# Patient Record
Sex: MALE | Race: WHITE | NOT HISPANIC OR LATINO | Employment: OTHER | URBAN - METROPOLITAN AREA
[De-identification: names, ages, dates, MRNs, and addresses within clinical notes are randomized per-mention and may not be internally consistent; named-entity substitution may affect disease eponyms.]

---

## 2020-06-14 ENCOUNTER — HOSPITAL ENCOUNTER (EMERGENCY)
Facility: HOSPITAL | Age: 65
Discharge: HOME | End: 2020-06-14
Attending: EMERGENCY MEDICINE | Admitting: EMERGENCY MEDICINE

## 2020-06-14 ENCOUNTER — HOSPITAL ENCOUNTER (EMERGENCY)
Facility: HOSPITAL | Age: 65
Discharge: STILL ADMITTED OR ADMINISTRATIVE | End: 2020-06-14

## 2020-06-14 ENCOUNTER — APPOINTMENT (OUTPATIENT)
Dept: CT IMAGING | Facility: HOSPITAL | Age: 65
End: 2020-06-14

## 2020-06-14 ENCOUNTER — APPOINTMENT (OUTPATIENT)
Dept: GENERAL RADIOLOGY | Facility: HOSPITAL | Age: 65
End: 2020-06-14

## 2020-06-14 VITALS
HEART RATE: 84 BPM | WEIGHT: 204.15 LBS | HEIGHT: 72 IN | BODY MASS INDEX: 27.65 KG/M2 | SYSTOLIC BLOOD PRESSURE: 108 MMHG | TEMPERATURE: 98.4 F | OXYGEN SATURATION: 98 % | DIASTOLIC BLOOD PRESSURE: 59 MMHG | RESPIRATION RATE: 18 BRPM

## 2020-06-14 DIAGNOSIS — S13.9XXA ACUTE SPRAIN OF LIGAMENT OF NECK, INITIAL ENCOUNTER: ICD-10-CM

## 2020-06-14 DIAGNOSIS — M50.30 DDD (DEGENERATIVE DISC DISEASE), CERVICAL: Primary | ICD-10-CM

## 2020-06-14 LAB
AMPHET+METHAMPHET UR QL: POSITIVE
BARBITURATES UR QL SCN: NEGATIVE
BENZODIAZ UR QL SCN: NEGATIVE
CANNABINOIDS SERPL QL: NEGATIVE
COCAINE UR QL: NEGATIVE
METHADONE UR QL SCN: NEGATIVE
OPIATES UR QL: NEGATIVE
OXYCODONE UR QL SCN: NEGATIVE

## 2020-06-14 PROCEDURE — 80307 DRUG TEST PRSMV CHEM ANLYZR: CPT | Performed by: EMERGENCY MEDICINE

## 2020-06-14 PROCEDURE — 99284 EMERGENCY DEPT VISIT MOD MDM: CPT

## 2020-06-14 PROCEDURE — 73030 X-RAY EXAM OF SHOULDER: CPT

## 2020-06-14 PROCEDURE — 72125 CT NECK SPINE W/O DYE: CPT

## 2020-06-14 RX ORDER — DICLOFENAC SODIUM 75 MG/1
75 TABLET, DELAYED RELEASE ORAL 2 TIMES DAILY
Qty: 10 TABLET | Refills: 0 | Status: SHIPPED | OUTPATIENT
Start: 2020-06-14

## 2020-06-14 RX ORDER — TIZANIDINE HYDROCHLORIDE 4 MG/1
4 CAPSULE, GELATIN COATED ORAL 3 TIMES DAILY PRN
Qty: 9 CAPSULE | Refills: 0 | Status: SHIPPED | OUTPATIENT
Start: 2020-06-14
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
## 2020-06-15 NOTE — ED NOTES
Patient given numbers to schedule follow ups. Verbalized understanding     Aishwarya Bernal, RN  06/14/20 6508

## 2020-06-15 NOTE — DISCHARGE INSTRUCTIONS
No heavy lifting or pulling for the next 3 days  Medication as directed  Follow-up with your primary care provider

## 2022-03-17 ENCOUNTER — HOSPITAL ENCOUNTER (EMERGENCY)
Facility: HOSPITAL | Age: 67
Discharge: HOME | End: 2022-03-17
Attending: EMERGENCY MEDICINE | Admitting: EMERGENCY MEDICINE

## 2022-03-17 VITALS
RESPIRATION RATE: 14 BRPM | HEART RATE: 93 BPM | OXYGEN SATURATION: 100 % | SYSTOLIC BLOOD PRESSURE: 161 MMHG | HEIGHT: 72 IN | BODY MASS INDEX: 25.74 KG/M2 | DIASTOLIC BLOOD PRESSURE: 87 MMHG | WEIGHT: 190.04 LBS | TEMPERATURE: 97.8 F

## 2022-03-17 DIAGNOSIS — M79.652 ACUTE PAIN OF LEFT THIGH: Primary | ICD-10-CM

## 2022-03-17 DIAGNOSIS — F15.10 METHAMPHETAMINE ABUSE: ICD-10-CM

## 2022-03-17 DIAGNOSIS — Z76.5 DRUG-SEEKING BEHAVIOR: ICD-10-CM

## 2022-03-17 DIAGNOSIS — E87.6 HYPOKALEMIA: ICD-10-CM

## 2022-03-17 LAB
ALBUMIN SERPL-MCNC: 4.2 G/DL (ref 3.5–5.2)
ALBUMIN/GLOB SERPL: 1.2 G/DL
ALP SERPL-CCNC: 120 U/L (ref 39–117)
ALT SERPL W P-5'-P-CCNC: 14 U/L (ref 1–41)
AMPHET+METHAMPHET UR QL: POSITIVE
ANION GAP SERPL CALCULATED.3IONS-SCNC: 13 MMOL/L (ref 5–15)
AST SERPL-CCNC: 14 U/L (ref 1–40)
BARBITURATES UR QL SCN: NEGATIVE
BASOPHILS # BLD AUTO: 0 10*3/MM3 (ref 0–0.2)
BASOPHILS NFR BLD AUTO: 0.3 % (ref 0–1.5)
BENZODIAZ UR QL SCN: NEGATIVE
BILIRUB SERPL-MCNC: 0.4 MG/DL (ref 0–1.2)
BUN SERPL-MCNC: 15 MG/DL (ref 8–23)
BUN/CREAT SERPL: 17.6 (ref 7–25)
CALCIUM SPEC-SCNC: 9.7 MG/DL (ref 8.6–10.5)
CANNABINOIDS SERPL QL: NEGATIVE
CHLORIDE SERPL-SCNC: 103 MMOL/L (ref 98–107)
CO2 SERPL-SCNC: 23 MMOL/L (ref 22–29)
COCAINE UR QL: NEGATIVE
CREAT SERPL-MCNC: 0.85 MG/DL (ref 0.76–1.27)
D DIMER PPP FEU-MCNC: 0.38 MG/L (FEU) (ref 0–0.59)
DEPRECATED RDW RBC AUTO: 42.4 FL (ref 37–54)
EGFRCR SERPLBLD CKD-EPI 2021: 95.8 ML/MIN/1.73
EOSINOPHIL # BLD AUTO: 0.1 10*3/MM3 (ref 0–0.4)
EOSINOPHIL NFR BLD AUTO: 1.8 % (ref 0.3–6.2)
ERYTHROCYTE [DISTWIDTH] IN BLOOD BY AUTOMATED COUNT: 15.5 % (ref 12.3–15.4)
GLOBULIN UR ELPH-MCNC: 3.5 GM/DL
GLUCOSE BLDC GLUCOMTR-MCNC: 112 MG/DL (ref 70–105)
GLUCOSE SERPL-MCNC: 118 MG/DL (ref 65–99)
HCT VFR BLD AUTO: 36.9 % (ref 37.5–51)
HGB BLD-MCNC: 12.6 G/DL (ref 13–17.7)
LYMPHOCYTES # BLD AUTO: 1 10*3/MM3 (ref 0.7–3.1)
LYMPHOCYTES NFR BLD AUTO: 15.5 % (ref 19.6–45.3)
MCH RBC QN AUTO: 27.2 PG (ref 26.6–33)
MCHC RBC AUTO-ENTMCNC: 34.3 G/DL (ref 31.5–35.7)
MCV RBC AUTO: 79.5 FL (ref 79–97)
METHADONE UR QL SCN: NEGATIVE
MONOCYTES # BLD AUTO: 0.6 10*3/MM3 (ref 0.1–0.9)
MONOCYTES NFR BLD AUTO: 9.4 % (ref 5–12)
NEUTROPHILS NFR BLD AUTO: 4.9 10*3/MM3 (ref 1.7–7)
NEUTROPHILS NFR BLD AUTO: 73 % (ref 42.7–76)
NRBC BLD AUTO-RTO: 0.1 /100 WBC (ref 0–0.2)
OPIATES UR QL: NEGATIVE
OXYCODONE UR QL SCN: NEGATIVE
PLATELET # BLD AUTO: 136 10*3/MM3 (ref 140–450)
PMV BLD AUTO: 8.8 FL (ref 6–12)
POTASSIUM SERPL-SCNC: 3.3 MMOL/L (ref 3.5–5.2)
PROT SERPL-MCNC: 7.7 G/DL (ref 6–8.5)
RBC # BLD AUTO: 4.64 10*6/MM3 (ref 4.14–5.8)
SODIUM SERPL-SCNC: 139 MMOL/L (ref 136–145)
WBC NRBC COR # BLD: 6.7 10*3/MM3 (ref 3.4–10.8)

## 2022-03-17 PROCEDURE — 80307 DRUG TEST PRSMV CHEM ANLYZR: CPT | Performed by: NURSE PRACTITIONER

## 2022-03-17 PROCEDURE — 80053 COMPREHEN METABOLIC PANEL: CPT | Performed by: NURSE PRACTITIONER

## 2022-03-17 PROCEDURE — 25010000002 KETOROLAC TROMETHAMINE PER 15 MG: Performed by: NURSE PRACTITIONER

## 2022-03-17 PROCEDURE — 85379 FIBRIN DEGRADATION QUANT: CPT | Performed by: NURSE PRACTITIONER

## 2022-03-17 PROCEDURE — 85025 COMPLETE CBC W/AUTO DIFF WBC: CPT | Performed by: NURSE PRACTITIONER

## 2022-03-17 PROCEDURE — 82962 GLUCOSE BLOOD TEST: CPT

## 2022-03-17 PROCEDURE — 99283 EMERGENCY DEPT VISIT LOW MDM: CPT

## 2022-03-17 PROCEDURE — 96374 THER/PROPH/DIAG INJ IV PUSH: CPT

## 2022-03-17 RX ORDER — SODIUM CHLORIDE 0.9 % (FLUSH) 0.9 %
10 SYRINGE (ML) INJECTION AS NEEDED
Status: DISCONTINUED | OUTPATIENT
Start: 2022-03-17 | End: 2022-03-18 | Stop reason: HOSPADM

## 2022-03-17 RX ORDER — POTASSIUM CHLORIDE 20 MEQ/1
40 TABLET, EXTENDED RELEASE ORAL ONCE
Status: COMPLETED | OUTPATIENT
Start: 2022-03-17 | End: 2022-03-17

## 2022-03-17 RX ORDER — KETOROLAC TROMETHAMINE 15 MG/ML
15 INJECTION, SOLUTION INTRAMUSCULAR; INTRAVENOUS ONCE
Status: COMPLETED | OUTPATIENT
Start: 2022-03-17 | End: 2022-03-17

## 2022-03-17 RX ADMIN — POTASSIUM CHLORIDE 40 MEQ: 1500 TABLET, EXTENDED RELEASE ORAL at 22:45

## 2022-03-17 RX ADMIN — KETOROLAC TROMETHAMINE 15 MG: 15 INJECTION, SOLUTION INTRAMUSCULAR; INTRAVENOUS at 22:45

## 2022-03-17 NOTE — ED PROVIDER NOTES
"Subjective   67-year-old male presents with a 10-day history of left hamstring pain that became worse today and radiates down to the calf.  He also reports being out of his medications for the last 2 weeks due to changing primary care.  He denies associated chest pain dyspnea travel DVT PE.  Denies fever sweats chills.  He reports that his blood sugar ranges from 200 to 350 mg/dL which is his normal.  He reports that he has been staying at [facility] and was given \"muscle relaxers and a little white pill\" as well as lidocaine patches, MRIs, and x-rays.  He reports a history significant for type 2 diabetes that is insulin-dependent, atrial fibrillation denies taking blood thinners, and hypertension.    1. Location: Left posterior thigh  2. Quality: Sore  3. Severity: Mild  4. Worsening factors: Ambulation  5. Alleviating factors: Rest  6. Onset: 1-1/2 weeks   7. Radiation: Left calf  8. Frequency: Constant periods of intensity  9. Co-morbidities: Past Medical History:  No date: Atrial fibrillation ([facility])  No date: Bipolar disorder ([facility])  No date: Diabetes mellitus ([facility])  No date: Hypertension  No date: TBI (traumatic brain injury) ([facility])  10. Source: Patient            Review of Systems   Musculoskeletal: Positive for arthralgias. Negative for gait problem and myalgias.   Skin: Negative for color change, pallor, rash and wound.   Neurological: Negative for weakness and numbness.   Hematological: Does not bruise/bleed easily.   All other systems reviewed and are negative.      Past Medical History:   Diagnosis Date   • Atrial fibrillation ([facility])    • Bipolar disorder ([facility])    • Diabetes mellitus ([facility])    • Hypertension    • TBI (traumatic brain injury) ([facility])        No Known Allergies    History reviewed. No pertinent surgical history.    History reviewed. No pertinent family history.    Social History     Socioeconomic History   • Marital status:    Tobacco Use   • Smoking status: Current Every Day Smoker "     Packs/day: 2.00     Years: 50.00     Pack years: 100.00     Types: Cigarettes   • Smokeless tobacco: Never Used   Vaping Use   • Vaping Use: Former   • Substances: Nicotine   Substance and Sexual Activity   • Alcohol use: Not Currently     Alcohol/week: 4.0 standard drinks     Types: 1 Cans of beer, 3 Shots of liquor per week     Comment: Whiskey   • Drug use: Not Currently     Frequency: 2.0 times per week     Types: Methamphetamines     Comment: 6-8 months ago   • Sexual activity: Not Currently           Objective   Physical Exam  Vitals and nursing note reviewed.   Constitutional:       General: He is sleeping. He is not in acute distress.     Appearance: Normal appearance. He is well-developed and normal weight. He is not toxic-appearing.   Cardiovascular:      Pulses: Normal pulses.   Musculoskeletal:         General: Tenderness present. No swelling, deformity or signs of injury. Normal range of motion.      Right upper leg: Normal.      Left upper leg: Tenderness present. No swelling or bony tenderness.      Right lower leg: No edema.      Left lower leg: No edema.        Legs:       Comments: Point tenderness noted of the left thigh.  There is no overlying erythema nor ecchymosis noted.  No palpable masses noted.  Motor function sensation intact.  Distal pulses strong equal bilaterally 2+.  Cap refill less than 2 seconds.   Skin:     General: Skin is warm and dry.      Capillary Refill: Capillary refill takes less than 2 seconds.      Coloration: Skin is not pale.      Findings: No bruising.   Neurological:      General: No focal deficit present.      Mental Status: He is oriented to person, place, and time and easily aroused.      Sensory: No sensory deficit.      Motor: No abnormal muscle tone.   Psychiatric:         Behavior: Behavior normal. Behavior is cooperative.         Thought Content: Thought content normal.         Judgment: Judgment normal.         Procedures           ED Course    No  radiology results for the last day  Medications   sodium chloride 0.9 % flush 10 mL (has no administration in time range)   potassium chloride (K-DUR,KLOR-CON) CR tablet 40 mEq (has no administration in time range)   ketorolac (TORADOL) injection 15 mg (has no administration in time range)     Labs Reviewed   COMPREHENSIVE METABOLIC PANEL - Abnormal; Notable for the following components:       Result Value    Glucose 118 (*)     Potassium 3.3 (*)     Alkaline Phosphatase 120 (*)     All other components within normal limits    Narrative:     GFR Normal >60  Chronic Kidney Disease <60  Kidney Failure <15     CBC WITH AUTO DIFFERENTIAL - Abnormal; Notable for the following components:    Hemoglobin 12.6 (*)     Hematocrit 36.9 (*)     RDW 15.5 (*)     Platelets 136 (*)     Lymphocyte % 15.5 (*)     All other components within normal limits   URINE DRUG SCREEN - Abnormal; Notable for the following components:    Amphet/Methamphet, Screen Positive (*)     All other components within normal limits    Narrative:     Negative Thresholds Per Drugs Screened:    Amphetamines                 500 ng/ml  Barbiturates                 200 ng/ml  Benzodiazepines              100 ng/ml  Cocaine                      300 ng/ml  Methadone                    300 ng/ml  Opiates                      300 ng/ml  Oxycodone                    100 ng/ml  THC                           50 ng/ml    The Normal Value for all drugs tested is negative. This report includes final unconfirmed screening results to be used for medical treatment purposes only. Unconfirmed results must not be used for non-medical purposes such as employment or legal testing. Clinical consideration should be applied to any drug of abuse test, particularly when unconfirmed results are used.          All urine drugs of abuse requests without chain of custody are for medical screening purposes only.  False positives are possible.     POCT GLUCOSE FINGERSTICK - Abnormal;  Notable for the following components:    Glucose 112 (*)     All other components within normal limits   D-DIMER, QUANTITATIVE - Normal    Narrative:     Reference Range  --------------------------------------------------------------------     < 0.50   Negative Predictive Value  0.50-0.59   Indeterminate    >= 0.60   Probable VTE             A very low percentage of patients with DVT may yield D-Dimer results   below the cut-off of 0.50 mg/L FEU.  This is known to be more   prevalent in patients with distal DVT.             Results of this test should always be interpreted in conjunction with   the patient's medical history, clinical presentation and other   findings.  Clinical diagnosis should not be based on the result of   INNOVANCE D-Dimer alone.   POCT GLUCOSE FINGERSTICK   CBC AND DIFFERENTIAL    Narrative:     The following orders were created for panel order CBC & Differential.  Procedure                               Abnormality         Status                     ---------                               -----------         ------                     CBC Auto Differential[[id number]]         Abnormal            Final result                 Please view results for these tests on the individual orders.                                                  MDM  Number of Diagnoses or Management Options  Acute pain of left thigh  Drug-seeking behavior  Hypokalemia  Methamphetamine abuse (HCC)  Diagnosis management comments: Chart Review: 8/10/2021 patient was seen for follow-up on anxiety disorder.  Comorbidity: Past Medical History:  No date: Atrial fibrillation ([facility])  No date: Bipolar disorder ([facility])  No date: Diabetes mellitus ([facility])  No date: Hypertension  No date: TBI (traumatic brain injury) ([facility])    Patient undressed and placed in gown for exam.  Appropriate PPE worn during patient exam.  Patient is drowsy on exam but easily arousable.  His pupils are noted to be pinpoint but reactive. Point tenderness noted of the  left thigh.  There is no overlying erythema nor ecchymosis noted.  No palpable masses noted.  Motor function sensation intact.  Distal pulses strong equal bilaterally 2+.  Cap refill less than 2 seconds.  Established and labs obtained.  Lab work-up unremarkable.  Patient was noted to have slightly low potassium, this was replaced orally.  He was given Toradol 15 mg IV for pain.  He was encouraged to increase potassium in his diet.  He is also encouraged to push water, he reports that he does not usually drink any.  He is instructed to rotate Tylenol Motrin as needed for pain.  He remains pink warm and dry no distress noted vital signs are stable.  He has had 2 thorough work-ups at [facility], see below with no acute findings.  He has a scheduled follow-up with primary care that he is encouraged to keep.  He is encouraged to stop using methamphetamine.  He was given a prescription for tramadol by previous provider as well as lidocaine patches.  He is encouraged to continue using these as needed.  Patient was told that he would not be given any narcotic analgesia.     Disposition/Treatment: Discussed results with patient, verbalized understanding.  Discussed reasons to return to the ER, patient verbalized understanding.  Agreeable with plan of care.  Patient was stable upon discharge.       Part of this note may be an electronic transcription/translation of spoken language to printed text using the Dragon Dictation System.            Amount and/or Complexity of Data Reviewed  Clinical lab tests: reviewed  Decide to obtain previous medical records or to obtain history from someone other than the patient: (Previous medical records obtained from [facility]:    3/2/2022 patient presented with left posterior thigh pain he was given Toradol 30 mg, Robaxin 500 mg, had a negative Doppler study.    Sodium 140 potassium 3.2 chloride 108 CO2 25 BUN 15 creatinine 1.1 glucose 148.    Patient then  presented back on 3/7/2022 for the same complaint had a radiographic images of the left femur that showed no acute osseous abnormalities dictated by Dr. Silverio Calle at 1445.  Patient was given a prescription for tramadol and lidocaine.)    Patient Progress  Patient progress: stable      Final diagnoses:   Acute pain of left thigh   Methamphetamine abuse (HCC)   Drug-seeking behavior   Hypokalemia       ED Disposition  ED Disposition     ED Disposition   Discharge    Condition   Stable    Comment   --             Mike Connors MD  [address] [address] [phone]    Schedule an appointment as soon as possible for a visit       [facility] EMERGENCY DEPARTMENT  [facility] [address] [phone]  Go to   If symptoms worsen         Medication List      No changes were made to your prescriptions during this visit.          Elsa Loaiza, APRN  03/17/22 2238

## 2022-03-18 NOTE — DISCHARGE INSTRUCTIONS
I would recommend that you stop using methamphetamines.  Increase your intake of potassium-rich foods as suggested per handout. Make sure you are drinking at least 95 ounces of water.  Rotate Tylenol Motrin as needed for pain.  Apply ice every 2 hours while awake, on for 20 minutes.  Schedule follow-up with your primary care physician for recheck.  Return to the ER for new or worsening symptoms.

## 2022-06-18 ENCOUNTER — HOSPITAL ENCOUNTER (EMERGENCY)
Facility: HOSPITAL | Age: 67
Discharge: HOME | End: 2022-06-18
Attending: EMERGENCY MEDICINE | Admitting: EMERGENCY MEDICINE

## 2022-06-18 VITALS
TEMPERATURE: 97.6 F | DIASTOLIC BLOOD PRESSURE: 80 MMHG | WEIGHT: 220 LBS | HEART RATE: 69 BPM | OXYGEN SATURATION: 99 % | RESPIRATION RATE: 18 BRPM | BODY MASS INDEX: 29.8 KG/M2 | HEIGHT: 72 IN | SYSTOLIC BLOOD PRESSURE: 174 MMHG

## 2022-06-18 DIAGNOSIS — M54.32 SCIATICA, LEFT SIDE: Primary | ICD-10-CM

## 2022-06-18 DIAGNOSIS — H66.012 NON-RECURRENT ACUTE SUPPURATIVE OTITIS MEDIA OF LEFT EAR WITH SPONTANEOUS RUPTURE OF TYMPANIC MEMBRANE: ICD-10-CM

## 2022-06-18 PROCEDURE — 96372 THER/PROPH/DIAG INJ SC/IM: CPT

## 2022-06-18 PROCEDURE — 99283 EMERGENCY DEPT VISIT LOW MDM: CPT

## 2022-06-18 PROCEDURE — 63710000001 ONDANSETRON ODT 4 MG TABLET DISPERSIBLE: Performed by: EMERGENCY MEDICINE

## 2022-06-18 PROCEDURE — 25010000002 METHYLPREDNISOLONE PER 125 MG: Performed by: EMERGENCY MEDICINE

## 2022-06-18 PROCEDURE — 25010000002 HYDROMORPHONE 1 MG/ML SOLUTION: Performed by: EMERGENCY MEDICINE

## 2022-06-18 RX ORDER — AMOXICILLIN AND CLAVULANATE POTASSIUM 875; 125 MG/1; MG/1
1 TABLET, FILM COATED ORAL 2 TIMES DAILY
Qty: 20 TABLET | Refills: 0 | Status: SHIPPED | OUTPATIENT
Start: 2022-06-18

## 2022-06-18 RX ORDER — ONDANSETRON 4 MG/1
4 TABLET, ORALLY DISINTEGRATING ORAL ONCE
Status: COMPLETED | OUTPATIENT
Start: 2022-06-18 | End: 2022-06-18

## 2022-06-18 RX ORDER — METHYLPREDNISOLONE SODIUM SUCCINATE 125 MG/2ML
80 INJECTION, POWDER, LYOPHILIZED, FOR SOLUTION INTRAMUSCULAR; INTRAVENOUS ONCE
Status: COMPLETED | OUTPATIENT
Start: 2022-06-18 | End: 2022-06-18

## 2022-06-18 RX ADMIN — METHYLPREDNISOLONE SODIUM SUCCINATE 80 MG: 125 INJECTION, POWDER, FOR SOLUTION INTRAMUSCULAR; INTRAVENOUS at 12:52

## 2022-06-18 RX ADMIN — HYDROMORPHONE HYDROCHLORIDE 1 MG: 1 INJECTION, SOLUTION INTRAMUSCULAR; INTRAVENOUS; SUBCUTANEOUS at 12:52

## 2022-06-18 RX ADMIN — ONDANSETRON 4 MG: 4 TABLET, ORALLY DISINTEGRATING ORAL at 12:52

## 2022-06-18 NOTE — DISCHARGE INSTRUCTIONS
Follow-up on Monday for MRI as previously scheduled.  Return to the emergency room for any new or worsening symptoms or if you have any other questions or concerns.  Take antibiotic as prescribed.  Follow-up with ENT as directed.  Avoid getting water in the left ear while bathing or showering.  You can use a cottonball coated and Vaseline in your ear as a barrier.

## 2022-06-18 NOTE — CASE MANAGEMENT/SOCIAL WORK
Case Management Discharge Note                          Met with patient in room wearing PPE: mask    Maintained distance greater than six feet and spent less than 15 minutes in the room    Eli Saldana RN    Phone [phone]  Fax [phone]    Final Discharge Disposition Code: 01 - home or self-care

## 2022-06-18 NOTE — ED PROVIDER NOTES
Subjective   Chief complaint: Left leg pain    67-year-old male presents with left leg pain.  Patient states the pain has been present for 4-1/2 months and getting progressively worse.  He saw his doctor about a week and a half ago and was diagnosed with sciatica.  He was prescribed pain medicine as well as a steroid.  He was scheduled to have an MRI this coming Monday.  He states he is out of his pain medicine and his pain is not controlled.  He denies any numbness, weakness, tingling.  He has had no bowel or bladder dysfunction.  He denies any fever.  He states pain is severe and worse when he attempts to ambulate.  He states the pain is mostly in his left buttock and posterior left thigh.  Sometimes the pain radiates down into his lower leg.  He also reports some mild low back pain.  He has had no swelling of the left leg.  He denies any chest pain or shortness of breath.      History provided by:  Patient      Review of Systems   Constitutional: Negative for fever.   HENT: Negative for congestion.    Respiratory: Negative for cough and shortness of breath.    Cardiovascular: Negative for chest pain.   Gastrointestinal: Negative for abdominal pain and vomiting.   Genitourinary: Negative for difficulty urinating and dysuria.   Musculoskeletal: Positive for back pain.        Left leg pain   Skin: Negative for rash.   Neurological: Negative for weakness and numbness.       Past Medical History:   Diagnosis Date   • Atrial fibrillation (HCC)    • Bipolar disorder (HCC)    • Diabetes mellitus (HCC)    • Hypertension    • TBI (traumatic brain injury) (HCC)        No Known Allergies    No past surgical history on file.    No family history on file.    Social History     Socioeconomic History   • Marital status:    Tobacco Use   • Smoking status: Current Every Day Smoker     Packs/day: 2.00     Years: 50.00     Pack years: 100.00     Types: Cigarettes   • Smokeless tobacco: Never Used   Vaping Use   • Vaping Use:  "Former   • Substances: Nicotine   Substance and Sexual Activity   • Alcohol use: Not Currently     Alcohol/week: 4.0 standard drinks     Types: 1 Cans of beer, 3 Shots of liquor per week     Comment: Whiskey   • Drug use: Not Currently     Frequency: 2.0 times per week     Types: Methamphetamines     Comment: 6-8 months ago   • Sexual activity: Not Currently       /97   Pulse 78   Temp 97.6 °F (36.4 °C)   Resp 18   Ht 182.9 cm (72\")   Wt 99.8 kg (220 lb)   SpO2 99%   BMI 29.84 kg/m²       Objective   Physical Exam  Vitals and nursing note reviewed.   Constitutional:       Appearance: Normal appearance.   HENT:      Head: Normocephalic and atraumatic.      Right Ear: Tympanic membrane normal.      Left Ear: Drainage present. Tympanic membrane is perforated and erythematous.   Eyes:      Pupils: Pupils are equal, round, and reactive to light.   Cardiovascular:      Rate and Rhythm: Normal rate and regular rhythm.   Pulmonary:      Effort: Pulmonary effort is normal. No respiratory distress.   Musculoskeletal:      Comments: Examination of the left leg is unremarkable.  There is no swelling or erythema.  There is no specific area of tenderness.  Good distal pulses and good cap refill.  Deep tendon reflexes are present and equal to the bilateral lower extremities.  Back is nontender to palpation as well.   Skin:     General: Skin is warm and dry.   Neurological:      General: No focal deficit present.      Mental Status: He is alert and oriented to person, place, and time.         Procedures           ED Course                                                 MDM   Patient was given Dilaudid and Solu-Medrol IM.  He is feeling better.  Patient asked to have his left ear looked at as well because it was hurting and he states he had decreased hearing out of his left ear.  He appears to have a perforated TM with a small amount of purulent drainage.  He will be started on Augmentin and will be given ENT " follow-up.  He will follow-up for his MRI on Monday as scheduled.  He is requesting pain medicine however inspect report shows the patient had a 30-day supply of Norco filled on May 26.  It is too soon to have another prescription at this time.  Patient voices understanding.      Final diagnoses:   Sciatica, left side   Non-recurrent acute suppurative otitis media of left ear with spontaneous rupture of tympanic membrane       ED Disposition  ED Disposition     ED Disposition   Discharge    Condition   Stable    Comment   --             [facility]  [address]  [facility] [address] [phone]  Call in 2 days           Medication List      New Prescriptions    amoxicillin-clavulanate 875-125 MG per tablet  Commonly known as: AUGMENTIN  Take 1 tablet by mouth 2 (Two) Times a Day.           Where to Get Your Medications      These medications were sent to [facility] [address] [place] [address]    Hours: Mon-Fri 7:00AM-7:00PM Phone: [phone]   · amoxicillin-clavulanate 875-125 MG per tablet          Vinnie Bhakta MD  06/18/22 9541

## 2022-06-19 ENCOUNTER — HOSPITAL ENCOUNTER (EMERGENCY)
Facility: HOSPITAL | Age: 67
Discharge: HOME | End: 2022-06-19
Attending: EMERGENCY MEDICINE | Admitting: EMERGENCY MEDICINE

## 2022-06-19 ENCOUNTER — APPOINTMENT (OUTPATIENT)
Dept: GENERAL RADIOLOGY | Facility: HOSPITAL | Age: 67
End: 2022-06-19

## 2022-06-19 VITALS
HEART RATE: 83 BPM | OXYGEN SATURATION: 99 % | RESPIRATION RATE: 18 BRPM | SYSTOLIC BLOOD PRESSURE: 135 MMHG | WEIGHT: 220 LBS | HEIGHT: 72 IN | DIASTOLIC BLOOD PRESSURE: 62 MMHG | BODY MASS INDEX: 29.8 KG/M2 | TEMPERATURE: 97.5 F

## 2022-06-19 DIAGNOSIS — M54.32 SCIATICA OF LEFT SIDE: Primary | ICD-10-CM

## 2022-06-19 DIAGNOSIS — M25.552 LEFT HIP PAIN: ICD-10-CM

## 2022-06-19 PROCEDURE — 73502 X-RAY EXAM HIP UNI 2-3 VIEWS: CPT

## 2022-06-19 PROCEDURE — 99282 EMERGENCY DEPT VISIT SF MDM: CPT

## 2022-06-19 PROCEDURE — 72110 X-RAY EXAM L-2 SPINE 4/>VWS: CPT

## 2022-06-19 PROCEDURE — 96372 THER/PROPH/DIAG INJ SC/IM: CPT

## 2022-06-19 PROCEDURE — 25010000002 HYDROMORPHONE 1 MG/ML SOLUTION: Performed by: EMERGENCY MEDICINE

## 2022-06-19 RX ORDER — PREDNISONE 20 MG/1
60 TABLET ORAL DAILY
Qty: 15 TABLET | Refills: 0 | Status: SHIPPED | OUTPATIENT
Start: 2022-06-19

## 2022-06-19 RX ORDER — MELOXICAM 15 MG/1
15 TABLET ORAL DAILY
Qty: 15 TABLET | Refills: 0 | Status: SHIPPED | OUTPATIENT
Start: 2022-06-19

## 2022-06-19 RX ADMIN — HYDROMORPHONE HYDROCHLORIDE 1 MG: 1 INJECTION, SOLUTION INTRAMUSCULAR; INTRAVENOUS; SUBCUTANEOUS at 03:28

## 2022-06-19 NOTE — ED NOTES
This RN attempted to give patient D/C instructions and phone number that family had called the ER from, told patient there was a phone in the ER waiting room lobby that he could use to call them from. Patient started yelling and cursing at this nurse refusing to leave, refused to listen to verbal redirection. Security called to escort patient out.

## 2022-06-19 NOTE — ED NOTES
A family member had called ER while patient was in the waiting room looking for patient. Told this RN that they would be here in approx 20 min. On D/C this RN attempted to call number back to get patient picked up from ER and no one answered.

## 2022-06-19 NOTE — ED PROVIDER NOTES
Subjective   Chief complaint: Patient is a 67-year-old who left ear with pain in his leg earlier this afternoon.  He actually did not leave the waiting room.  He states that his ride never showed up.  He is still in back and severe pain in his left back and leg.  He has a history of this pain for 4 and half months.  He is supposed to have an MRI this Monday next to us for primary care provider in [place]'s office.  But with persisting and worsening pain he signed himself back into the emergency room.  No numbness or weakness.  No loss of bowel or bladder.  No fever.  No abdominal pain.  No chest pain.  No shortness of breath.    Context: As above.  Progressive issue.    Duration: On and off for many months.    Timing: Persistent    Severity: Severe    Associated Symptoms: As above        PCP:            Review of Systems   Constitutional: Negative for fever.   Respiratory: Negative for shortness of breath.    Cardiovascular: Negative for chest pain.   Gastrointestinal: Negative.    Genitourinary: Negative.    Musculoskeletal: Positive for arthralgias and back pain.   Skin: Negative.    Neurological: Negative for weakness and numbness.   Psychiatric/Behavioral: Negative.        Past Medical History:   Diagnosis Date   • Atrial fibrillation (HCC)    • Bipolar disorder (HCC)    • Diabetes mellitus (HCC)    • Hypertension    • TBI (traumatic brain injury) (HCC)        No Known Allergies    No past surgical history on file.    No family history on file.    Social History     Socioeconomic History   • Marital status:    Tobacco Use   • Smoking status: Current Every Day Smoker     Packs/day: 2.00     Years: 50.00     Pack years: 100.00     Types: Cigarettes   • Smokeless tobacco: Never Used   Vaping Use   • Vaping Use: Former   • Substances: Nicotine   Substance and Sexual Activity   • Alcohol use: Not Currently     Alcohol/week: 4.0 standard drinks     Types: 1 Cans of beer, 3 Shots of liquor per week      Comment: Hui   • Drug use: Not Currently     Frequency: 2.0 times per week     Types: Methamphetamines     Comment: 6-8 months ago   • Sexual activity: Not Currently           Objective   Physical Exam  Vitals and nursing note reviewed.   Constitutional:       Appearance: Normal appearance.   HENT:      Head: Normocephalic and atraumatic.   Eyes:      Extraocular Movements: Extraocular movements intact.      Pupils: Pupils are equal, round, and reactive to light.   Cardiovascular:      Rate and Rhythm: Normal rate.      Pulses: Normal pulses.   Pulmonary:      Effort: Pulmonary effort is normal.      Breath sounds: Normal breath sounds.   Abdominal:      Tenderness: There is no abdominal tenderness.   Musculoskeletal:      Cervical back: Normal range of motion and neck supple.        Back:         Legs:       Comments: Patient neurovascular intact distally.  Can hold leg up for count of 5.  Good strength.  Pain with range of motion of hip and movement of the leg into the back.  Neurovascularly intact distally.   Skin:     General: Skin is warm and dry.      Capillary Refill: Capillary refill takes less than 2 seconds.   Neurological:      General: No focal deficit present.      Mental Status: He is alert and oriented to person, place, and time.      Cranial Nerves: No cranial nerve deficit.      Sensory: No sensory deficit.      Motor: No weakness.      Coordination: Coordination normal.      Deep Tendon Reflexes: Reflexes normal.   Psychiatric:         Mood and Affect: Mood normal.         Thought Content: Thought content normal.         Judgment: Judgment normal.         Procedures           ED Course                                                 MDM  Number of Diagnoses or Management Options  Left hip pain  Sciatica of left side  Diagnosis management comments: I reviewed radiologic studies.  Patient has MRI scheduled.  He has had this chronic issue.  He was seen earlier in the day for the same thing.  He  did not obtain his ride home.  He sat in the waiting room and then eventually signed himself back in.  His pain was treated here.  He per prior providers review of the chart had filled pain medication and already used it.  I did send Mobic and steroid for the patient.  And will discharge to follow-up and return for worsening symptoms signs or concerns.       Amount and/or Complexity of Data Reviewed  Tests in the radiology section of CPT®: reviewed  Independent visualization of images, tracings, or specimens: yes    Patient Progress  Patient progress: stable      Final diagnoses:   None   Sciatica  Left hip pain    ED Disposition  ED Disposition     None          No follow-up provider specified.       Medication List      No changes were made to your prescriptions during this visit.          Kenyon Quiroz,   06/19/22 [id number]

## 2022-08-16 ENCOUNTER — HOSPITAL ENCOUNTER (EMERGENCY)
Facility: HOSPITAL | Age: 67
Discharge: HOME | End: 2022-08-16
Attending: EMERGENCY MEDICINE

## 2022-08-16 VITALS
SYSTOLIC BLOOD PRESSURE: 126 MMHG | BODY MASS INDEX: 29.8 KG/M2 | HEIGHT: 72 IN | WEIGHT: 220 LBS | RESPIRATION RATE: 18 BRPM | HEART RATE: 98 BPM | DIASTOLIC BLOOD PRESSURE: 62 MMHG | TEMPERATURE: 98.7 F | OXYGEN SATURATION: 98 %

## 2022-08-16 PROCEDURE — 99211 OFF/OP EST MAY X REQ PHY/QHP: CPT | Performed by: EMERGENCY MEDICINE

## 2022-08-19 ENCOUNTER — INPATIENT HOSPITAL (OUTPATIENT)
Dept: URBAN - METROPOLITAN AREA HOSPITAL 76 | Facility: HOSPITAL | Age: 67
End: 2022-08-19
Payer: MEDICARE

## 2022-08-19 DIAGNOSIS — N17.9 ACUTE KIDNEY FAILURE, UNSPECIFIED: ICD-10-CM

## 2022-08-19 DIAGNOSIS — R94.5 ABNORMAL RESULTS OF LIVER FUNCTION STUDIES: ICD-10-CM

## 2022-08-19 DIAGNOSIS — D64.9 ANEMIA, UNSPECIFIED: ICD-10-CM

## 2022-08-19 PROCEDURE — 99222 1ST HOSP IP/OBS MODERATE 55: CPT

## 2022-08-20 ENCOUNTER — INPATIENT HOSPITAL (OUTPATIENT)
Dept: URBAN - METROPOLITAN AREA HOSPITAL 76 | Facility: HOSPITAL | Age: 67
End: 2022-08-20
Payer: MEDICARE

## 2022-08-20 DIAGNOSIS — D64.9 ANEMIA, UNSPECIFIED: ICD-10-CM

## 2022-08-20 DIAGNOSIS — R74.8 ABNORMAL LEVELS OF OTHER SERUM ENZYMES: ICD-10-CM

## 2022-08-20 PROCEDURE — 99232 SBSQ HOSP IP/OBS MODERATE 35: CPT | Performed by: INTERNAL MEDICINE
